# Patient Record
Sex: MALE | Race: WHITE | Employment: FULL TIME | URBAN - METROPOLITAN AREA
[De-identification: names, ages, dates, MRNs, and addresses within clinical notes are randomized per-mention and may not be internally consistent; named-entity substitution may affect disease eponyms.]

---

## 2023-03-13 DIAGNOSIS — E78.5 HYPERLIPIDEMIA, UNSPECIFIED HYPERLIPIDEMIA TYPE: ICD-10-CM

## 2023-03-13 RX ORDER — ROSUVASTATIN CALCIUM 40 MG/1
40 TABLET, COATED ORAL DAILY
COMMUNITY
Start: 2017-08-07 | End: 2023-03-13 | Stop reason: SDUPTHER

## 2023-03-13 RX ORDER — ROSUVASTATIN CALCIUM 40 MG/1
40 TABLET, COATED ORAL DAILY
Qty: 90 TABLET | Refills: 0 | Status: SHIPPED | OUTPATIENT
Start: 2023-03-13 | End: 2023-07-13

## 2023-06-02 ENCOUNTER — OFFICE VISIT (OUTPATIENT)
Dept: PRIMARY CARE | Facility: CLINIC | Age: 58
End: 2023-06-02
Payer: COMMERCIAL

## 2023-06-02 VITALS
BODY MASS INDEX: 31.21 KG/M2 | DIASTOLIC BLOOD PRESSURE: 90 MMHG | HEART RATE: 96 BPM | SYSTOLIC BLOOD PRESSURE: 138 MMHG | HEIGHT: 75 IN | WEIGHT: 251 LBS

## 2023-06-02 DIAGNOSIS — R10.31 ABDOMINAL PAIN, ACUTE, RIGHT LOWER QUADRANT: Primary | ICD-10-CM

## 2023-06-02 PROBLEM — I71.20 THORACIC AORTIC ANEURYSM (TAA) (CMS-HCC): Status: ACTIVE | Noted: 2023-06-02

## 2023-06-02 PROBLEM — M50.10 HERNIATION OF CERVICAL INTERVERTEBRAL DISC WITH RADICULOPATHY: Status: ACTIVE | Noted: 2023-06-02

## 2023-06-02 PROBLEM — K35.80 ACUTE APPENDICITIS: Status: RESOLVED | Noted: 2023-06-02 | Resolved: 2023-06-02

## 2023-06-02 PROBLEM — I25.10 CORONARY ARTERY CALCIFICATION SEEN ON CT SCAN: Status: ACTIVE | Noted: 2023-06-02

## 2023-06-02 PROBLEM — K35.80 ACUTE APPENDICITIS: Status: ACTIVE | Noted: 2023-06-02

## 2023-06-02 PROBLEM — D12.6 TUBULAR ADENOMA OF COLON: Status: ACTIVE | Noted: 2023-06-02

## 2023-06-02 PROBLEM — H40.1131 PRIMARY OPEN ANGLE GLAUCOMA OF BOTH EYES, MILD STAGE: Status: ACTIVE | Noted: 2021-02-16

## 2023-06-02 PROBLEM — E78.5 HYPERLIPEMIA: Status: ACTIVE | Noted: 2023-06-02

## 2023-06-02 PROCEDURE — 4004F PT TOBACCO SCREEN RCVD TLK: CPT | Performed by: FAMILY MEDICINE

## 2023-06-02 PROCEDURE — 99215 OFFICE O/P EST HI 40 MIN: CPT | Performed by: FAMILY MEDICINE

## 2023-06-02 RX ORDER — LATANOPROST 50 UG/ML
1 SOLUTION/ DROPS OPHTHALMIC DAILY
COMMUNITY
Start: 2022-12-20

## 2023-06-02 NOTE — PROGRESS NOTES
"Subjective   Patient ID: Tanner Ingram is a 57 y.o. male who presents for Abdominal Pain.    HPI  Crescencio is presenting with RLQ pain that began 2 days ago.  Never had this pain before, denies prior appendix removal.  Does not follow up with a gastroenterologist.  Rates the pain 9/10, had chills last night and minor blood in stool yesterday.    Denies changes in appetite, N/V, diarrhea, constipation, fever, radiating pain, numbness/tingling.  Has not taken anything to reduce his pain.    Prior colonoscopies in 2016 and 2019 are unremarkable for pathologies/polyps/diverticula in the ascending colon.    Review of Systems  All pertinent positive symptoms are included in the history of present illness.    All other systems have been reviewed and are negative and noncontributory to this patient's current ailments.    Current Outpatient Medications   Medication Instructions    latanoprost (Xalatan) 0.005 % ophthalmic solution 1 drop, Both Eyes, Daily    rosuvastatin (CRESTOR) 40 mg, oral, Daily     Not on File    Immunization History   Administered Date(s) Administered    Influenza, Unspecified 10/20/2020    Moderna SARS-CoV-2 Vaccination 03/26/2021, 04/23/2021    Tdap 08/17/2021     Past Surgical History:   Procedure Laterality Date    COLONOSCOPY  08/07/2017    Complete Colonoscopy     No family history on file.  Social History     Tobacco Use    Smoking status: Some Days     Types: Cigarettes    Smokeless tobacco: Never       Objective   Visit Vitals  /90   Pulse 96   Ht 1.905 m (6' 3\")   Wt 114 kg (251 lb)   BMI 31.37 kg/m²   Smoking Status Some Days   BSA 2.46 m²       Physical Exam  CONSTITUTIONAL - well nourished, well developed, looks like stated age, in no acute distress, not ill-appearing, and not tired appearing  SKIN - tan skin, normal skin turgor without rash, lesions, or nodules visualized  HEAD - no trauma, normocephalic  EYES - normal external exam  NECK - supple without rigidity, no neck mass was " observed, no thyromegaly or thyroid nodules  CHEST - clear to auscultation, no wheezing, no crackles and no rales, good effort  CARDIAC - regular rate and regular rhythm, no skipped beats, no murmur  ABDOMEN - significant tenderness to moderate palpation in the right mid quadrant area, although McBurney's is negative, jumping on right leg is negative, negative psoas, negative obturator, negative Rovsing, no rebound but there is some guarding  EXTREMITIES - no edema, no deformities  PSYCHIATRIC - alert, pleasant and cordial, age-appropriate  IMMUNOLOGIC - no cervical lymphadenopathy     Assessment/Plan   Problem List Items Addressed This Visit       Abdominal pain, acute, right lower quadrant - Primary     Based on your history and physical examination, I am concerned about an acute abdomen including the possibility of a retroverted appendicitis.  Although your McBurney sign and jumping on your right leg was negative, you have exquisite tenderness to palpation in the right quadrant.    We talked about other etiologies including diverticulitis, small bowel obstruction, liver dysfunction, or even a  problem, but those are very low on the list based on your colonoscopies from 2016 and 2019, both of which did not reveal any diverticulosis in the ascending colon, although you had multitudes of diverticuli in the descending colon and sigmoid colon.    At 2:46 PM, I spoke with Abraham at [facility] emergency department providing him report for your pending arrival.    Please drive to the emergency department to be evaluated further, hoping that you will leave here to go straight to the emergency department because of this is appendicitis, I suspect surgical intervention is going to be warranted this evening

## 2023-06-02 NOTE — ASSESSMENT & PLAN NOTE
Based on your history and physical examination, I am concerned about an acute abdomen including the possibility of a retroverted appendicitis.  Although your McBurney sign and jumping on your right leg was negative, you have exquisite tenderness to palpation in the right quadrant.    We talked about other etiologies including diverticulitis, small bowel obstruction, liver dysfunction, or even a  problem, but those are very low on the list based on your colonoscopies from 2016 and 2019, both of which did not reveal any diverticulosis in the ascending colon, although you had multitudes of diverticuli in the descending colon and sigmoid colon.    At 2:46 PM, I spoke with Abraham at [facility] emergency department providing him report for your pending arrival.    Please drive to the emergency department to be evaluated further, hoping that you will leave here to go straight to the emergency department because of this is appendicitis, I suspect surgical intervention is going to be warranted this evening

## 2023-06-02 NOTE — ASSESSMENT & PLAN NOTE
Based on signs/symptoms and previous medical history, I believe this may be acute appendicitis of a retroverted appendix. At 2:46 PM today I spoke with Abraham at [facility] and provided a report. After discussion, I told Tanner to drive to [facility]'s ED for further evaluation and let them know that I called and the ED is expecting him.

## 2023-07-09 DIAGNOSIS — E78.5 HYPERLIPIDEMIA, UNSPECIFIED HYPERLIPIDEMIA TYPE: ICD-10-CM

## 2023-07-13 RX ORDER — ROSUVASTATIN CALCIUM 40 MG/1
TABLET, COATED ORAL
Qty: 30 TABLET | Refills: 0 | Status: SHIPPED | OUTPATIENT
Start: 2023-07-13 | End: 2023-08-03 | Stop reason: SDUPTHER

## 2023-07-19 DIAGNOSIS — E78.2 MIXED HYPERLIPIDEMIA: Primary | ICD-10-CM

## 2023-07-20 ENCOUNTER — LAB (OUTPATIENT)
Dept: LAB | Facility: LAB | Age: 58
End: 2023-07-20
Payer: COMMERCIAL

## 2023-07-20 DIAGNOSIS — E78.2 MIXED HYPERLIPIDEMIA: ICD-10-CM

## 2023-07-20 LAB
ALANINE AMINOTRANSFERASE (SGPT) (U/L) IN SER/PLAS: 25 U/L (ref 10–52)
ALBUMIN (G/DL) IN SER/PLAS: 4.3 G/DL (ref 3.4–5)
ALKALINE PHOSPHATASE (U/L) IN SER/PLAS: 71 U/L (ref 33–120)
ANION GAP IN SER/PLAS: 9 MMOL/L (ref 10–20)
ASPARTATE AMINOTRANSFERASE (SGOT) (U/L) IN SER/PLAS: 22 U/L (ref 9–39)
BILIRUBIN TOTAL (MG/DL) IN SER/PLAS: 0.3 MG/DL (ref 0–1.2)
CALCIUM (MG/DL) IN SER/PLAS: 8.7 MG/DL (ref 8.6–10.3)
CARBON DIOXIDE, TOTAL (MMOL/L) IN SER/PLAS: 29 MMOL/L (ref 21–32)
CHLORIDE (MMOL/L) IN SER/PLAS: 105 MMOL/L (ref 98–107)
CHOLESTEROL (MG/DL) IN SER/PLAS: 182 MG/DL (ref 0–199)
CHOLESTEROL IN HDL (MG/DL) IN SER/PLAS: 33.8 MG/DL
CHOLESTEROL/HDL RATIO: 5.4
CREATININE (MG/DL) IN SER/PLAS: 0.72 MG/DL (ref 0.5–1.3)
GFR MALE: >90 ML/MIN/1.73M2
GLUCOSE (MG/DL) IN SER/PLAS: 109 MG/DL (ref 74–99)
LDL: ABNORMAL MG/DL (ref 0–99)
POTASSIUM (MMOL/L) IN SER/PLAS: 4.6 MMOL/L (ref 3.5–5.3)
PROTEIN TOTAL: 6.7 G/DL (ref 6.4–8.2)
SODIUM (MMOL/L) IN SER/PLAS: 138 MMOL/L (ref 136–145)
TRIGLYCERIDE (MG/DL) IN SER/PLAS: 593 MG/DL (ref 0–149)
UREA NITROGEN (MG/DL) IN SER/PLAS: 20 MG/DL (ref 6–23)
VLDL: ABNORMAL MG/DL (ref 0–40)

## 2023-07-20 PROCEDURE — 80061 LIPID PANEL: CPT

## 2023-07-20 PROCEDURE — 80053 COMPREHEN METABOLIC PANEL: CPT

## 2023-07-20 PROCEDURE — 36415 COLL VENOUS BLD VENIPUNCTURE: CPT

## 2023-07-20 NOTE — RESULT ENCOUNTER NOTE
We can review in the office on August 3, but suffice to say that cholesterol 182, 33, LDL could not be calculated because triglycerides 593  Previous 153, 40, 65, 237  Please know that we sent over a 30-day prescription for you on July 13 so please restart that medication

## 2023-08-03 ENCOUNTER — OFFICE VISIT (OUTPATIENT)
Dept: PRIMARY CARE | Facility: CLINIC | Age: 58
End: 2023-08-03
Payer: COMMERCIAL

## 2023-08-03 VITALS
HEART RATE: 70 BPM | SYSTOLIC BLOOD PRESSURE: 130 MMHG | WEIGHT: 253 LBS | DIASTOLIC BLOOD PRESSURE: 80 MMHG | BODY MASS INDEX: 31.62 KG/M2

## 2023-08-03 DIAGNOSIS — I25.10 CORONARY ARTERY CALCIFICATION SEEN ON CT SCAN: ICD-10-CM

## 2023-08-03 DIAGNOSIS — E78.2 MIXED HYPERLIPIDEMIA: Primary | ICD-10-CM

## 2023-08-03 DIAGNOSIS — R03.0 ELEVATED BLOOD-PRESSURE READING WITHOUT DIAGNOSIS OF HYPERTENSION: ICD-10-CM

## 2023-08-03 DIAGNOSIS — I71.21 ANEURYSM OF ASCENDING AORTA WITHOUT RUPTURE (CMS-HCC): ICD-10-CM

## 2023-08-03 PROBLEM — K35.80 APPENDICITIS, ACUTE: Status: ACTIVE | Noted: 2023-08-03

## 2023-08-03 PROBLEM — R10.31 ABDOMINAL PAIN, ACUTE, RIGHT LOWER QUADRANT: Status: RESOLVED | Noted: 2023-06-02 | Resolved: 2023-08-03

## 2023-08-03 PROBLEM — K35.80 APPENDICITIS, ACUTE: Status: RESOLVED | Noted: 2023-08-03 | Resolved: 2023-08-03

## 2023-08-03 PROCEDURE — 99214 OFFICE O/P EST MOD 30 MIN: CPT | Performed by: FAMILY MEDICINE

## 2023-08-03 PROCEDURE — 4004F PT TOBACCO SCREEN RCVD TLK: CPT | Performed by: FAMILY MEDICINE

## 2023-08-03 RX ORDER — ROSUVASTATIN CALCIUM 40 MG/1
40 TABLET, COATED ORAL DAILY
Qty: 90 TABLET | Refills: 1 | Status: SHIPPED | OUTPATIENT
Start: 2023-08-03 | End: 2024-02-19 | Stop reason: SDUPTHER

## 2023-08-03 ASSESSMENT — PATIENT HEALTH QUESTIONNAIRE - PHQ9
SUM OF ALL RESPONSES TO PHQ9 QUESTIONS 1 AND 2: 0
1. LITTLE INTEREST OR PLEASURE IN DOING THINGS: NOT AT ALL
2. FEELING DOWN, DEPRESSED OR HOPELESS: NOT AT ALL

## 2023-08-03 NOTE — PROGRESS NOTES
Subjective   Patient ID: Tanner Ingram is a 58 y.o. male who presents for Hyperlipidemia.    HPI  1.  Hyperlipidemia, Coronary artery calcification seen on CT scan, Thoracic aortic aneurysm (TAA)  ASCVD Risk of 19.4% today with CT Cardiac score of 396.2 on 2/23/2023.   Additional finding of a 4.2 cm dilation of the ascending thoracic aortic aneurysm (6-12 month follow-up CT/MR angiogram recommended)   Last lipid panel (7/20/2023) with cholesterol 182, LDL 33.8, HDL 5.4, and .  Taking atorvastatin 40 mg once daily.   Denies medication side effects including myalgias.     2.  History of appendectomy  Since last visit, he underwent an appendectomy, leaving the office and having the appendix out the following day  Says he was golfing 5 days later, without any significant residual symptoms whatsoever  Feels fantastic, very thankful for the office for taking care of him so quickly    Review of Systems  All pertinent positive symptoms are included in the history of present illness.    All other systems have been reviewed and are negative and noncontributory to this patient's current ailments.    No Known Allergies    Immunization History   Administered Date(s) Administered    Influenza, Unspecified 10/20/2020    Moderna SARS-CoV-2 Vaccination 03/26/2021, 04/23/2021    Tdap vaccine, age 10 years and older (BOOSTRIX) 08/17/2021       Objective   Visit Vitals  /80   Pulse 70   Wt 115 kg (253 lb)   BMI 31.62 kg/m²   Smoking Status Some Days   BSA 2.47 m²       Physical Exam  CONSTITUTIONAL - well nourished, well developed, looks like stated age, in no acute distress, not ill-appearing, and not tired appearing  SKIN - normal skin color and pigmentation, normal skin turgor without rash, lesions, or nodules visualized  HEAD - no trauma, normocephalic  EYES - pupils are equal and reactive to light, extraocular muscles are intact, and normal external exam  CHEST - clear to auscultation, no wheezing, no crackles and no  rales, good effort  CARDIAC - regular rate and regular rhythm, no skipped beats, no murmur  ABDOMEN - no organomegaly, soft, nontender, nondistended, normal bowel sounds, no guarding/rebound/rigidity, negative McBurney sign and negative De La Vega sign  EXTREMITIES - no edema, no deformities  NEUROLOGICAL - normal gait, normal balance, normal motor, no ataxia, alert, oriented and no focal signs  PSYCHIATRIC - alert, pleasant and cordial, age-appropriate    Assessment/Plan   Problem List Items Addressed This Visit       Hyperlipemia - Primary     Triglycerides significantly elevated, need to do a better job with dietary changes including decreasing carbohydrates  Please schedule physical exam 6 months, continue to work on lifestyle modifications         Relevant Medications    rosuvastatin (Crestor) 40 mg tablet    Thoracic aortic aneurysm (TAA) (CMS/[facility])     We will continue to monitor this through imaging  Schedule physical exam 6 months to ensure that we have proper follow-up in place         Coronary artery calcification seen on CT scan     Need to continue to reduce risk factors to lower cholesterol is much as possible especially with your CT cardiac score elevation         Elevated blood-pressure reading without diagnosis of hypertension     I would like to have you monitor and record blood pressures at home   Blood pressure goal should be below 130/80, ideally 120/80  If the blood pressure is too high, we need to consider starting antihypertensive therapy

## 2023-08-03 NOTE — ASSESSMENT & PLAN NOTE
Triglycerides significantly elevated, need to do a better job with dietary changes including decreasing carbohydrates  Please schedule physical exam 6 months, continue to work on lifestyle modifications

## 2023-08-03 NOTE — ASSESSMENT & PLAN NOTE
I would like to have you monitor and record blood pressures at home   Blood pressure goal should be below 130/80, ideally 120/80  If the blood pressure is too high, we need to consider starting antihypertensive therapy

## 2023-08-03 NOTE — ASSESSMENT & PLAN NOTE
Need to continue to reduce risk factors to lower cholesterol is much as possible especially with your CT cardiac score elevation

## 2023-08-03 NOTE — ASSESSMENT & PLAN NOTE
We will continue to monitor this through imaging  Schedule physical exam 6 months to ensure that we have proper follow-up in place

## 2023-08-20 LAB
GRAM STAIN: NORMAL
TISSUE/WOUND CULTURE/SMEAR: NORMAL

## 2023-11-29 ENCOUNTER — TELEPHONE (OUTPATIENT)
Dept: CARDIOLOGY | Facility: CLINIC | Age: 58
End: 2023-11-29
Payer: COMMERCIAL

## 2023-11-29 NOTE — TELEPHONE ENCOUNTER
Called patient and left message to call and schedule one year appointment with echo.  This will be with Dr. Irvin schedule after 5/2/2024

## 2023-11-30 ENCOUNTER — TELEPHONE (OUTPATIENT)
Dept: CARDIOLOGY | Facility: CLINIC | Age: 58
End: 2023-11-30
Payer: COMMERCIAL

## 2023-11-30 NOTE — TELEPHONE ENCOUNTER
Patient had to pay over 4,000.00 for stress test last year.   Before he does the echo this year he wanted to make sure it is needed?   Please advise.

## 2023-12-07 ENCOUNTER — OFFICE VISIT (OUTPATIENT)
Dept: DERMATOLOGY | Facility: CLINIC | Age: 58
End: 2023-12-07
Payer: COMMERCIAL

## 2023-12-07 DIAGNOSIS — L72.0 EPIDERMAL CYST: ICD-10-CM

## 2023-12-07 DIAGNOSIS — M67.449 DIGITAL MUCOUS CYST OF FINGER: Primary | ICD-10-CM
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
## 2023-12-07 PROCEDURE — 88305 TISSUE EXAM BY PATHOLOGIST: CPT | Mod: TC,DER | Performed by: SPECIALIST

## 2023-12-07 PROCEDURE — 88305 TISSUE EXAM BY PATHOLOGIST: CPT | Performed by: DERMATOLOGY

## 2023-12-07 PROCEDURE — 26010 DRAINAGE OF FINGER ABSCESS: CPT | Performed by: SPECIALIST

## 2023-12-07 PROCEDURE — 99214 OFFICE O/P EST MOD 30 MIN: CPT | Performed by: SPECIALIST

## 2023-12-07 PROCEDURE — 4004F PT TOBACCO SCREEN RCVD TLK: CPT | Performed by: SPECIALIST

## 2023-12-07 PROCEDURE — 11104 PUNCH BX SKIN SINGLE LESION: CPT | Performed by: SPECIALIST

## 2023-12-07 NOTE — PROGRESS NOTES
Subjective   HPI: Tanner Ingram is a 58 y.o. male is here for evaluation and treatment of a developing cyst on the back of my neck.  An insect bite that will not improve involving by left lower leg.  And a painful bump involving the tip of my finger.    ROS: No other skin or systemic complaints other than what is documented elsewhere in the note.    ALLERGIES: Patient has no known allergies.    SOCIAL:  reports that he has been smoking cigarettes. He has never used smokeless tobacco. No history on file for alcohol use and drug use.    Objective     Description: Patient has a slightly inflamed subcutaneous cystic lesion posterior neck.  Clinically is consistent with a slightly inflamed epidermal cyst.  Patient also has a cystic slightly inflamed lesion involving the third finger of his left hand.  This overlies the nailbed leading to subsequent Deepak of the nail plate.  Clinically is consistent with an inflamed digital myxoid cyst.  Patient also has a previously treated area of an alleged arthropod assault involving left lower anterior tibial surface.  This area has not improved with topical steroids.    Assessment/Plan   1. Digital mucous cyst of finger  Left Dorsal Mid 3rd Finger    Related Medications  triamcinolone acetonide (Kenalog) injection 10 mg      2. Epidermal cyst  Posterior Mid Neck    3. Bitten or stung by nonvenomous insect and other nonvenomous arthropods, initial encounter  Left Lower Leg - Anterior    Lesion biopsy - Left Lower Leg - Anterior    Specimen 1 - Dermatopathology- DERM LAB  Differential Diagnosis: Arthropod Assult   Check Margins Yes/No?:    Comments:    Dermpath Lab: Routine Histopathology (formalin-fixed tissue)         FOLLOW UP:  weeks for sterile surgical excision of his partially inflamed epidermal cyst.  Treatment of his digital myxoid cyst can be checked at that time.  Suture removal of a biopsy for diagnosis only of the inflamed arthropod assault.    The patient was  encouraged to contact me with any further questions or concerns.  Abel Zamudio MD  12/7/2023

## 2023-12-07 NOTE — PROGRESS NOTES
Subjective     Tanner Ingram is a 58 y.o. male who presents for the following: Infection (Left Hand index finger ) and Growth Concerns (Mid Posterior Neck ).     Review of Systems:  No other skin or systemic complaints other than what is documented elsewhere in the note.    The following portions of the chart were reviewed this encounter and updated as appropriate:          Skin Cancer History  No skin cancer on file.      Specialty Problems    None       Objective   Well appearing patient in no apparent distress; mood and affect are within normal limits.    A focused skin examination was performed. All findings within normal limits unless otherwise noted below.    Assessment/Plan   1. Epidermal cyst  Posterior Mid Neck    2. Bitten or stung by nonvenomous insect and other nonvenomous arthropods, initial encounter  Left Lower Leg - Anterior    Lesion biopsy - Left Lower Leg - Anterior  Type of biopsy: punch    Informed consent: discussed and consent obtained    Timeout: patient name, date of birth, surgical site, and procedure verified    Procedure prep:  Patient was prepped and draped  Anesthesia: the lesion was anesthetized in a standard fashion    Anesthetic:  1% lidocaine w/ epinephrine 1-100,000 local infiltration  Punch size:  4 mm  Suture size:  4-0  Suture type: Prolene (polypropylene)    Suture removal (days):  14  Hemostasis achieved with: suture    Outcome: patient tolerated procedure well    Post-procedure details: sterile dressing applied and wound care instructions given    Dressing type: bandage and petrolatum      Specimen 1 - Dermatopathology- DERM LAB  Differential Diagnosis: Arthropod Assult   Check Margins Yes/No?:    Comments:    Dermpath Lab: Routine Histopathology (formalin-fixed tissue)    3. Digital mucous cyst of finger  Left Dorsal Mid 3rd Finger

## 2023-12-11 LAB
LABORATORY COMMENT REPORT: NORMAL
PATH REPORT.FINAL DX SPEC: NORMAL
PATH REPORT.GROSS SPEC: NORMAL
PATH REPORT.RELEVANT HX SPEC: NORMAL
PATH REPORT.TOTAL CANCER: NORMAL

## 2024-02-01 DIAGNOSIS — Z12.11 COLON CANCER SCREENING: Primary | ICD-10-CM

## 2024-02-01 RX ORDER — SOD SULF/POT CHLORIDE/MAG SULF 1.479 G
TABLET ORAL
Qty: 24 TABLET | Refills: 0 | Status: SHIPPED | OUTPATIENT
Start: 2024-02-01

## 2024-02-05 ENCOUNTER — APPOINTMENT (OUTPATIENT)
Dept: PRIMARY CARE | Facility: CLINIC | Age: 59
End: 2024-02-05
Payer: COMMERCIAL

## 2024-02-06 ENCOUNTER — APPOINTMENT (OUTPATIENT)
Dept: DERMATOLOGY | Facility: CLINIC | Age: 59
End: 2024-02-06
Payer: COMMERCIAL

## 2024-02-13 DIAGNOSIS — E78.2 MIXED HYPERLIPIDEMIA: ICD-10-CM

## 2024-02-13 RX ORDER — ROSUVASTATIN CALCIUM 40 MG/1
40 TABLET, COATED ORAL DAILY
Qty: 90 TABLET | Refills: 1 | OUTPATIENT
Start: 2024-02-13

## 2024-02-19 RX ORDER — ROSUVASTATIN CALCIUM 40 MG/1
40 TABLET, COATED ORAL DAILY
Qty: 30 TABLET | Refills: 0 | Status: SHIPPED | OUTPATIENT
Start: 2024-02-19 | End: 2024-02-27 | Stop reason: SDUPTHER

## 2024-02-22 ENCOUNTER — LAB (OUTPATIENT)
Dept: LAB | Facility: LAB | Age: 59
End: 2024-02-22
Payer: COMMERCIAL

## 2024-02-22 DIAGNOSIS — Z12.5 PROSTATE CANCER SCREENING: ICD-10-CM

## 2024-02-22 DIAGNOSIS — Z11.4 ENCOUNTER FOR SCREENING FOR HIV: ICD-10-CM

## 2024-02-22 DIAGNOSIS — Z00.00 ANNUAL PHYSICAL EXAM: ICD-10-CM

## 2024-02-22 DIAGNOSIS — Z11.59 NEED FOR HEPATITIS C SCREENING TEST: ICD-10-CM

## 2024-02-22 DIAGNOSIS — Z00.00 ANNUAL PHYSICAL EXAM: Primary | ICD-10-CM

## 2024-02-22 LAB
ALBUMIN SERPL BCP-MCNC: 4.1 G/DL (ref 3.4–5)
ALP SERPL-CCNC: 66 U/L (ref 33–136)
ALT SERPL W P-5'-P-CCNC: 26 U/L (ref 10–52)
ANION GAP SERPL CALC-SCNC: 11 MMOL/L (ref 10–20)
AST SERPL W P-5'-P-CCNC: 21 U/L (ref 9–39)
BASOPHILS # BLD AUTO: 0.02 X10*3/UL (ref 0–0.1)
BASOPHILS NFR BLD AUTO: 0.3 %
BILIRUB SERPL-MCNC: 0.4 MG/DL (ref 0–1.2)
BUN SERPL-MCNC: 17 MG/DL (ref 6–23)
CALCIUM SERPL-MCNC: 9.1 MG/DL (ref 8.6–10.3)
CHLORIDE SERPL-SCNC: 103 MMOL/L (ref 98–107)
CHOLEST SERPL-MCNC: 165 MG/DL (ref 0–199)
CHOLESTEROL/HDL RATIO: 4.2
CO2 SERPL-SCNC: 26 MMOL/L (ref 21–32)
CREAT SERPL-MCNC: 0.74 MG/DL (ref 0.5–1.3)
EGFRCR SERPLBLD CKD-EPI 2021: >90 ML/MIN/1.73M*2
EOSINOPHIL # BLD AUTO: 0.05 X10*3/UL (ref 0–0.7)
EOSINOPHIL NFR BLD AUTO: 0.8 %
ERYTHROCYTE [DISTWIDTH] IN BLOOD BY AUTOMATED COUNT: 13.7 % (ref 11.5–14.5)
GLUCOSE SERPL-MCNC: 99 MG/DL (ref 74–99)
HCT VFR BLD AUTO: 49.6 % (ref 41–52)
HCV AB SER QL: NONREACTIVE
HDLC SERPL-MCNC: 39 MG/DL
HGB BLD-MCNC: 16.6 G/DL (ref 13.5–17.5)
HIV 1+2 AB+HIV1 P24 AG SERPL QL IA: NONREACTIVE
IMM GRANULOCYTES # BLD AUTO: 0.02 X10*3/UL (ref 0–0.7)
IMM GRANULOCYTES NFR BLD AUTO: 0.3 % (ref 0–0.9)
LDLC SERPL CALC-MCNC: 47 MG/DL
LYMPHOCYTES # BLD AUTO: 1.84 X10*3/UL (ref 1.2–4.8)
LYMPHOCYTES NFR BLD AUTO: 30 %
MCH RBC QN AUTO: 29.7 PG (ref 26–34)
MCHC RBC AUTO-ENTMCNC: 33.5 G/DL (ref 32–36)
MCV RBC AUTO: 89 FL (ref 80–100)
MONOCYTES # BLD AUTO: 0.48 X10*3/UL (ref 0.1–1)
MONOCYTES NFR BLD AUTO: 7.8 %
NEUTROPHILS # BLD AUTO: 3.72 X10*3/UL (ref 1.2–7.7)
NEUTROPHILS NFR BLD AUTO: 60.8 %
NON HDL CHOLESTEROL: 126 MG/DL (ref 0–149)
NRBC BLD-RTO: 0 /100 WBCS (ref 0–0)
PLATELET # BLD AUTO: 215 X10*3/UL (ref 150–450)
POTASSIUM SERPL-SCNC: 4.1 MMOL/L (ref 3.5–5.3)
PROT SERPL-MCNC: 7.2 G/DL (ref 6.4–8.2)
PSA SERPL-MCNC: 0.99 NG/ML
RBC # BLD AUTO: 5.59 X10*6/UL (ref 4.5–5.9)
SODIUM SERPL-SCNC: 136 MMOL/L (ref 136–145)
T4 FREE SERPL-MCNC: 0.93 NG/DL (ref 0.61–1.12)
TRIGL SERPL-MCNC: 395 MG/DL (ref 0–149)
TSH SERPL-ACNC: 4.2 MIU/L (ref 0.44–3.98)
VLDL: 79 MG/DL (ref 0–40)
WBC # BLD AUTO: 6.1 X10*3/UL (ref 4.4–11.3)

## 2024-02-22 PROCEDURE — 86803 HEPATITIS C AB TEST: CPT

## 2024-02-22 PROCEDURE — 84443 ASSAY THYROID STIM HORMONE: CPT

## 2024-02-22 PROCEDURE — 85025 COMPLETE CBC W/AUTO DIFF WBC: CPT

## 2024-02-22 PROCEDURE — 84439 ASSAY OF FREE THYROXINE: CPT

## 2024-02-22 PROCEDURE — 87389 HIV-1 AG W/HIV-1&-2 AB AG IA: CPT

## 2024-02-22 PROCEDURE — 80053 COMPREHEN METABOLIC PANEL: CPT

## 2024-02-22 PROCEDURE — 84153 ASSAY OF PSA TOTAL: CPT

## 2024-02-22 PROCEDURE — 80061 LIPID PANEL: CPT

## 2024-02-22 PROCEDURE — 36415 COLL VENOUS BLD VENIPUNCTURE: CPT

## 2024-02-22 NOTE — RESULT ENCOUNTER NOTE
We will review test results on February 27:  Cholesterol 165, 39, 47, 395 previously 182, 33, LDL could not be calculated secondary to triglycerides of 593  TSH 4.20 previously 5.65  T4 is normal, so I do not think we need to treat unless you are symptomatic  HIV and hepatitis C negative  Sugar, kidney, liver, electrolytes normal  Prostate cancer screening test normal  Complete blood cell count normal

## 2024-02-27 ENCOUNTER — OFFICE VISIT (OUTPATIENT)
Dept: PRIMARY CARE | Facility: CLINIC | Age: 59
End: 2024-02-27
Payer: COMMERCIAL

## 2024-02-27 VITALS
HEART RATE: 75 BPM | WEIGHT: 260 LBS | SYSTOLIC BLOOD PRESSURE: 144 MMHG | HEIGHT: 75 IN | DIASTOLIC BLOOD PRESSURE: 90 MMHG | BODY MASS INDEX: 32.33 KG/M2

## 2024-02-27 DIAGNOSIS — I71.21 ANEURYSM OF ASCENDING AORTA WITHOUT RUPTURE (CMS-HCC): ICD-10-CM

## 2024-02-27 DIAGNOSIS — Z00.00 ANNUAL PHYSICAL EXAM: Primary | ICD-10-CM

## 2024-02-27 DIAGNOSIS — E78.2 MIXED HYPERLIPIDEMIA: ICD-10-CM

## 2024-02-27 DIAGNOSIS — I10 PRIMARY HYPERTENSION: ICD-10-CM

## 2024-02-27 PROBLEM — M50.10 HERNIATION OF CERVICAL INTERVERTEBRAL DISC WITH RADICULOPATHY: Status: RESOLVED | Noted: 2023-06-02 | Resolved: 2024-02-27

## 2024-02-27 LAB
POC APPEARANCE, URINE: CLEAR
POC BILIRUBIN, URINE: NEGATIVE
POC BLOOD, URINE: NEGATIVE
POC COLOR, URINE: YELLOW
POC GLUCOSE, URINE: NEGATIVE MG/DL
POC KETONES, URINE: NEGATIVE MG/DL
POC LEUKOCYTES, URINE: NEGATIVE
POC NITRITE,URINE: NEGATIVE
POC PH, URINE: 6 PH
POC PROTEIN, URINE: NEGATIVE MG/DL
POC SPECIFIC GRAVITY, URINE: 1.02
POC UROBILINOGEN, URINE: 0.2 EU/DL

## 2024-02-27 PROCEDURE — 3077F SYST BP >= 140 MM HG: CPT | Performed by: FAMILY MEDICINE

## 2024-02-27 PROCEDURE — 93000 ELECTROCARDIOGRAM COMPLETE: CPT | Performed by: FAMILY MEDICINE

## 2024-02-27 PROCEDURE — 99396 PREV VISIT EST AGE 40-64: CPT | Performed by: FAMILY MEDICINE

## 2024-02-27 PROCEDURE — 81002 URINALYSIS NONAUTO W/O SCOPE: CPT | Performed by: FAMILY MEDICINE

## 2024-02-27 PROCEDURE — 99214 OFFICE O/P EST MOD 30 MIN: CPT | Performed by: FAMILY MEDICINE

## 2024-02-27 PROCEDURE — 4004F PT TOBACCO SCREEN RCVD TLK: CPT | Performed by: FAMILY MEDICINE

## 2024-02-27 PROCEDURE — 3079F DIAST BP 80-89 MM HG: CPT | Performed by: FAMILY MEDICINE

## 2024-02-27 RX ORDER — OLMESARTAN MEDOXOMIL 20 MG/1
20 TABLET ORAL DAILY
Qty: 90 TABLET | Refills: 1 | Status: SHIPPED | OUTPATIENT
Start: 2024-02-27 | End: 2024-08-25

## 2024-02-27 RX ORDER — ROSUVASTATIN CALCIUM 40 MG/1
40 TABLET, COATED ORAL DAILY
Qty: 30 TABLET | Refills: 0 | Status: SHIPPED
Start: 2024-02-27 | End: 2024-03-25 | Stop reason: SDUPTHER

## 2024-02-27 ASSESSMENT — PATIENT HEALTH QUESTIONNAIRE - PHQ9
2. FEELING DOWN, DEPRESSED OR HOPELESS: NOT AT ALL
SUM OF ALL RESPONSES TO PHQ9 QUESTIONS 1 AND 2: 0
1. LITTLE INTEREST OR PLEASURE IN DOING THINGS: NOT AT ALL

## 2024-02-27 NOTE — ASSESSMENT & PLAN NOTE
Stable. No changes to medical management   Continue to follow with cardiology, echo scheduled in a few months

## 2024-02-27 NOTE — ASSESSMENT & PLAN NOTE
Declined shingles vaccine  Colonoscopy scheduled for April  Physical exam without abnormalities  EKG with sinus rhythm, occasional ectopic beat

## 2024-02-27 NOTE — PROGRESS NOTES
"Subjective   Patient ID: Tanner Ingram \"Radha" is a 58 y.o. male who presents for Annual Exam and Hyperlipidemia.    Past Medical, Surgical, and Family History reviewed and updated in chart.    Reviewed all medications by prescribing practitioner or clinical pharmacist (such as prescriptions, OTCs, herbal therapies and supplements) and documented in the medical record.    HPI  1.  Physical exam.  Colonoscopy: scheduled for April  Immunizations: Tdap 2021, declines shingles vaccine  Social: smokes 1/2 pack per day, for 20 years (10 pack years)    2.  HLD.  Controlled on Rosuvastatin  Denies any medication side effects including myalgias  Denies ACS symptoms    3.  Ascending thoracic aortic aneurysm.  Follows with cardiology  Mild dilation with diameter of 4.2 on CT cardiac score  Has upcoming TTE scheduled for 5/7/2024    4.  HTN.  Has had multiple elevated blood pressure readings in office  Not currently on any medication for elevated blood pressure  Denies any headaches, blurred vision or ACS symptoms    Review of Systems  All pertinent positive symptoms are included in the history of present illness.    All other systems have been reviewed and are negative and noncontributory to this patient's current ailments.    Past Medical History:   Diagnosis Date    Hyperlipidemia, unspecified 06/17/2022    Hyperlipemia     Past Surgical History:   Procedure Laterality Date    COLONOSCOPY  08/07/2017    Complete Colonoscopy     Social History     Tobacco Use    Smoking status: Some Days     Types: Cigarettes    Smokeless tobacco: Never     No family history on file.  Immunization History   Administered Date(s) Administered    Flu vaccine (IIV4), preservative free *Check age/dose* 11/14/2023    Influenza, Unspecified 10/20/2020    Moderna SARS-CoV-2 Vaccination 03/26/2021, 04/23/2021    Tdap vaccine, age 7 year and older (BOOSTRIX, ADACEL) 08/17/2021     Current Outpatient Medications   Medication Instructions    latanoprost " "(Xalatan) 0.005 % ophthalmic solution 1 drop, Both Eyes, Daily    olmesartan (BENICAR) 20 mg, oral, Daily    rosuvastatin (CRESTOR) 40 mg, oral, Daily    sod sulf-pot chloride-mag sulf (Sutab) 1.479-0.188- 0.225 gram tablet Starting at 6pm open one bottle of pills and fill glass provided with water and drink according to prep sheet. Start the 2nd bottle with directions on prep sheet 5 hours before procedure time     No Known Allergies    Objective   Vitals:    02/27/24 0832 02/27/24 0839   BP: 140/82 144/90   Pulse: 75    Weight: 118 kg (260 lb)    Height: 1.905 m (6' 3\")      Body mass index is 32.5 kg/m².    BP Readings from Last 3 Encounters:   02/27/24 144/90   08/03/23 130/80   06/16/23 147/86      Wt Readings from Last 3 Encounters:   02/27/24 118 kg (260 lb)   08/03/23 115 kg (253 lb)   06/16/23 116 kg (255 lb)      Office Visit on 02/27/2024   Component Date Value    POC Color, Urine 02/27/2024 Yellow     POC Appearance, Urine 02/27/2024 Clear     POC Glucose, Urine 02/27/2024 NEGATIVE     POC Bilirubin, Urine 02/27/2024 NEGATIVE     POC Ketones, Urine 02/27/2024 NEGATIVE     POC Specific Gravity, Ur* 02/27/2024 1.020     POC Blood, Urine 02/27/2024 NEGATIVE     POC PH, Urine 02/27/2024 6.0     POC Protein, Urine 02/27/2024 NEGATIVE     POC Urobilinogen, Urine 02/27/2024 0.2     Poc Nitrite, Urine 02/27/2024 NEGATIVE     POC Leukocytes, Urine 02/27/2024 NEGATIVE    Lab on 02/22/2024   Component Date Value    Cholesterol 02/22/2024 165     HDL-Cholesterol 02/22/2024 39.0     Cholesterol/HDL Ratio 02/22/2024 4.2     LDL Calculated 02/22/2024 47     VLDL 02/22/2024 79 (H)     Triglycerides 02/22/2024 395 (H)     Non HDL Cholesterol 02/22/2024 126     Prostate Specific AG 02/22/2024 0.99     Thyroid Stimulating Horm* 02/22/2024 4.20 (H)     Glucose 02/22/2024 99     Sodium 02/22/2024 136     Potassium 02/22/2024 4.1     Chloride 02/22/2024 103     Bicarbonate 02/22/2024 26     Anion Gap 02/22/2024 11     Urea " Nitrogen 02/22/2024 17     Creatinine 02/22/2024 0.74     eGFR 02/22/2024 >90     Calcium 02/22/2024 9.1     Albumin 02/22/2024 4.1     Alkaline Phosphatase 02/22/2024 66     Total Protein 02/22/2024 7.2     AST 02/22/2024 21     Bilirubin, Total 02/22/2024 0.4     ALT 02/22/2024 26     WBC 02/22/2024 6.1     nRBC 02/22/2024 0.0     RBC 02/22/2024 5.59     Hemoglobin 02/22/2024 16.6     Hematocrit 02/22/2024 49.6     MCV 02/22/2024 89     MCH 02/22/2024 29.7     MCHC 02/22/2024 33.5     RDW 02/22/2024 13.7     Platelets 02/22/2024 215     Neutrophils % 02/22/2024 60.8     Immature Granulocytes %,* 02/22/2024 0.3     Lymphocytes % 02/22/2024 30.0     Monocytes % 02/22/2024 7.8     Eosinophils % 02/22/2024 0.8     Basophils % 02/22/2024 0.3     Neutrophils Absolute 02/22/2024 3.72     Immature Granulocytes Ab* 02/22/2024 0.02     Lymphocytes Absolute 02/22/2024 1.84     Monocytes Absolute 02/22/2024 0.48     Eosinophils Absolute 02/22/2024 0.05     Basophils Absolute 02/22/2024 0.02     HIV 1/2 Antigen/Antibody* 02/22/2024 Nonreactive     Hepatitis C AB 02/22/2024 Nonreactive     Thyroxine, Free 02/22/2024 0.93      Physical Exam  CONSTITUTIONAL - well nourished, well developed, looks like stated age, in no acute distress, not ill-appearing, and not tired appearing  SKIN - normal skin color and pigmentation, normal skin turgor without rash, lesions, or nodules visualized  HEAD - no trauma, normocephalic  EYES - normal external exam  NECK - supple without rigidity, no neck mass was observed, no thyromegaly or thyroid nodules  CHEST - clear to auscultation, no wheezing, no crackles and no rales, good effort  CARDIAC - regular rate and regular rhythm, no skipped beats, no murmur  EXTREMITIES - no obvious or evident edema, no obvious or evident deformities  NEUROLOGICAL - normal gait, normal balance, normal motor, no ataxia, alert, oriented and no focal signs  PSYCHIATRIC - alert, pleasant and cordial,  age-appropriate  IMMUNOLOGIC - no cervical lymphadenopathy    Assessment/Plan   Problem List Items Addressed This Visit       Hyperlipemia     Triglycerides remain elevated but better controlled  Please keep working on dietary changes with a goal of weight loss         Relevant Medications    rosuvastatin (Crestor) 40 mg tablet    Thoracic aortic aneurysm (TAA) (CMS/HCC)     Stable. No changes to medical management   Continue to follow with cardiology, echo scheduled in a few months         Primary hypertension     BP continues to be elevated, and therefore by definition you have hypertension    Highly recommend starting olmesartan 20 mg daily to reduce not only your blood pressure, but it should reduce risk of premature aneurysm rupture    I would like to have you monitor and record blood pressures at home   Blood pressure goal should be below 130/80, ideally 120/80  If the blood pressure is too high or too low, we need to consider making adjustments to your antihypertensive therapy         Relevant Medications    olmesartan (BENIcar) 20 mg tablet    Annual physical exam - Primary     Declined shingles vaccine  Colonoscopy scheduled for April  Physical exam without abnormalities  EKG with sinus rhythm, occasional ectopic beat         Relevant Orders    POCT UA (nonautomated) manually resulted (Completed)    ECG 12 lead (Clinic Performed) (Completed)

## 2024-02-27 NOTE — ASSESSMENT & PLAN NOTE
BP continues to be elevated, and therefore by definition you have hypertension    Highly recommend starting olmesartan 20 mg daily to reduce not only your blood pressure, but it should reduce risk of premature aneurysm rupture    I would like to have you monitor and record blood pressures at home   Blood pressure goal should be below 130/80, ideally 120/80  If the blood pressure is too high or too low, we need to consider making adjustments to your antihypertensive therapy

## 2024-02-27 NOTE — ASSESSMENT & PLAN NOTE
Triglycerides remain elevated but better controlled  Please keep working on dietary changes with a goal of weight loss

## 2024-03-21 DIAGNOSIS — E78.2 MIXED HYPERLIPIDEMIA: ICD-10-CM

## 2024-03-21 RX ORDER — ROSUVASTATIN CALCIUM 40 MG/1
40 TABLET, COATED ORAL DAILY
Qty: 90 TABLET | Refills: 1 | OUTPATIENT
Start: 2024-03-21

## 2024-03-25 RX ORDER — ROSUVASTATIN CALCIUM 40 MG/1
40 TABLET, COATED ORAL DAILY
Qty: 90 TABLET | Refills: 1 | Status: SHIPPED | OUTPATIENT
Start: 2024-03-25 | End: 2024-09-21

## 2024-04-01 ENCOUNTER — APPOINTMENT (OUTPATIENT)
Dept: GASTROENTEROLOGY | Facility: EXTERNAL LOCATION | Age: 59
End: 2024-04-01
Payer: COMMERCIAL

## 2024-04-08 ENCOUNTER — OFFICE VISIT (OUTPATIENT)
Dept: GASTROENTEROLOGY | Facility: EXTERNAL LOCATION | Age: 59
End: 2024-04-08
Payer: COMMERCIAL

## 2024-04-08 DIAGNOSIS — D12.2 BENIGN NEOPLASM OF ASCENDING COLON: ICD-10-CM

## 2024-04-08 DIAGNOSIS — Z86.010 PERSONAL HISTORY OF COLONIC POLYPS: Primary | ICD-10-CM

## 2024-04-08 DIAGNOSIS — Z12.11 SCREEN FOR COLON CANCER: ICD-10-CM

## 2024-04-08 PROCEDURE — 88305 TISSUE EXAM BY PATHOLOGIST: CPT | Performed by: PATHOLOGY

## 2024-04-08 PROCEDURE — 45385 COLONOSCOPY W/LESION REMOVAL: CPT | Performed by: INTERNAL MEDICINE

## 2024-04-08 PROCEDURE — 88305 TISSUE EXAM BY PATHOLOGIST: CPT

## 2024-04-08 NOTE — PROGRESS NOTES
Patient underwent colonoscopy for history of polyps and 4 small polyps removed.  The exam was otherwise unremarkable and well-tolerated.  I recommend repeat 5 years.

## 2024-04-09 ENCOUNTER — LAB REQUISITION (OUTPATIENT)
Dept: LAB | Facility: HOSPITAL | Age: 59
End: 2024-04-09
Payer: COMMERCIAL

## 2024-05-07 ENCOUNTER — APPOINTMENT (OUTPATIENT)
Dept: CARDIOLOGY | Facility: CLINIC | Age: 59
End: 2024-05-07
Payer: COMMERCIAL

## 2024-05-13 ENCOUNTER — APPOINTMENT (OUTPATIENT)
Dept: CARDIOLOGY | Facility: CLINIC | Age: 59
End: 2024-05-13
Payer: COMMERCIAL

## 2024-09-05 NOTE — PROGRESS NOTES
"Subjective   Patient ID: Tanner Ingram \"Radha" is a 59 y.o. male who presents for Hypertension and Hyperlipidemia.    Past Medical, Surgical, and Family History reviewed and updated in chart.    Reviewed all medications by prescribing practitioner or clinical pharmacist (such as prescriptions, OTCs, herbal therapies and supplements) and documented in the medical record.    HPI  1. Hypertension  Blood pressure today on intake was 124/80  Current antihypertensive regimen is w/ Benicar 20 mg daily  Denies any cardiopulmonary symptoms such as chest pain or shortness of breath   CMP from 6 months prior was within normal limits    2. Hyperlipidemia  Last lipid panel was completed 6 months prior and was remarkable for:  --> Chol 165, LDL 47,  and Triglycerides 395  Currently on crestor 40 mg and tolerating well, no significant side effects  CT calcium scoring from 2023 was remarkable for a total score of 396.2      3. Abnormal TSH  Last lab draw had abnormal TSH with normal T4, he acknowledges some increase in fatigue and weight gain  Unclear etiology  Feels like it began when he turned 58    4. Loud Snoring  Wife reports he snores loudly, has to sleep in a different room or couch  Is affecting her sleep as well  Increase in fatigue and weight gain  Reports witnessed stop of breathing  Does not wake him however    FINDINGS:   The score and distribution of calcium in the coronary arteries is as   follows:      .7, mid to distal vessel   , proximal vessel and diagonal branch   LCx 61.5, proximal vessel and obtuse marginal branch   RCA 0,      Total   396.2     3. Subclinical hypothyroidism  TSH level from 6 months prior was 4.20 currently without therapy.  Denies any symptoms of unintentional weight gain, dry skin, and constipation     Review of Systems  All pertinent positive symptoms are included in the history of present illness.    All other systems have been reviewed and are negative and noncontributory to " "this patient's current ailments.    Past Medical History:   Diagnosis Date    Hyperlipidemia, unspecified 06/17/2022    Hyperlipemia     Past Surgical History:   Procedure Laterality Date    COLONOSCOPY  08/07/2017    Complete Colonoscopy     Social History     Tobacco Use    Smoking status: Some Days     Types: Cigarettes    Smokeless tobacco: Never     No family history on file.  Immunization History   Administered Date(s) Administered    Flu vaccine (IIV4), preservative free *Check age/dose* 11/14/2023    Influenza, Unspecified 10/20/2020    Moderna SARS-CoV-2 Vaccination 03/26/2021, 04/23/2021    Tdap vaccine, age 7 year and older (BOOSTRIX, ADACEL) 08/17/2021     Current Outpatient Medications   Medication Instructions    latanoprost (Xalatan) 0.005 % ophthalmic solution 1 drop, Both Eyes, Daily    olmesartan (BENICAR) 20 mg, oral, Daily    rosuvastatin (CRESTOR) 40 mg, oral, Daily     No Known Allergies    Objective   Vitals:    09/10/24 0947   BP: 124/80   Pulse: 70   Weight: 113 kg (250 lb)   Height: 1.905 m (6' 3\")     Body mass index is 31.25 kg/m².    BP Readings from Last 3 Encounters:   09/10/24 124/80   02/27/24 144/90   08/03/23 130/80      Wt Readings from Last 3 Encounters:   09/10/24 113 kg (250 lb)   02/27/24 118 kg (260 lb)   08/03/23 115 kg (253 lb)      Physical Exam  CONSTITUTIONAL - well nourished, well developed, looks like stated age, in no acute distress, not ill-appearing, and not tired appearing  SKIN - normal skin color and pigmentation, normal skin turgor without rash, lesions, or nodules visualized  HEAD - no trauma, normocephalic  EYES - extraocular muscles are intact, and normal external exam  NECK - supple without rigidity, no neck mass was observed, no thyromegaly or thyroid nodules  CHEST - clear to auscultation, no wheezing, no crackles and no rales, good effort  CARDIAC - regular rate and regular rhythm, no skipped beats, no murmur  ABDOMEN - no hernia, no mass, non tender, no " distention  EXTREMITIES - no obvious or evident edema, no obvious or evident deformities  NEUROLOGICAL - alert, oriented and no focal signs  PSYCHIATRIC - alert, pleasant and cordial, age-appropriate    Assessment/Plan   Problem List Items Addressed This Visit       Hyperlipemia - Primary     Triglycerides remain elevated but better controlled  Please keep working on dietary changes with a goal of weight loss  We will repeat lipid in 3 months after discussing lifestyle and dietary changes and should it continue to be elevated we discussed treating with Katerine  Return to clinic in 3 months to reevaluate          Relevant Medications    rosuvastatin (Crestor) 40 mg tablet    Other Relevant Orders    Lipid panel    Lipid Panel    Primary hypertension     BP at goal today  Repeat CMP today  Refill given         Relevant Medications    olmesartan (BENIcar) 20 mg tablet    Other Relevant Orders    Comprehensive Metabolic Panel    Loud snoring     Reviewed sleep study expectations and importance of evaluation  Likely related to his increase in weight gain and fatigue  STOP BANG Score 6 for high risk for JNIA  Referral to sleep study given today         Relevant Orders    In-Center Sleep Study (Non-Sleep Provider Only)    Abnormal TSH     We will continue to monitor with repeat TSH with reflex panel  Should T4 be abnormal we will treat         Relevant Orders    TSH with reflex to Free T4 if abnormal

## 2024-09-10 ENCOUNTER — APPOINTMENT (OUTPATIENT)
Dept: PRIMARY CARE | Facility: CLINIC | Age: 59
End: 2024-09-10
Payer: COMMERCIAL

## 2024-09-10 VITALS
WEIGHT: 250 LBS | BODY MASS INDEX: 31.08 KG/M2 | HEART RATE: 70 BPM | DIASTOLIC BLOOD PRESSURE: 80 MMHG | SYSTOLIC BLOOD PRESSURE: 124 MMHG | HEIGHT: 75 IN

## 2024-09-10 DIAGNOSIS — E03.8 SUBCLINICAL HYPOTHYROIDISM: ICD-10-CM

## 2024-09-10 DIAGNOSIS — I10 PRIMARY HYPERTENSION: ICD-10-CM

## 2024-09-10 DIAGNOSIS — R79.89 ABNORMAL TSH: ICD-10-CM

## 2024-09-10 DIAGNOSIS — R06.83 LOUD SNORING: ICD-10-CM

## 2024-09-10 DIAGNOSIS — E78.2 MIXED HYPERLIPIDEMIA: Primary | ICD-10-CM

## 2024-09-10 PROCEDURE — 3079F DIAST BP 80-89 MM HG: CPT | Performed by: FAMILY MEDICINE

## 2024-09-10 PROCEDURE — 99214 OFFICE O/P EST MOD 30 MIN: CPT | Performed by: FAMILY MEDICINE

## 2024-09-10 PROCEDURE — 4004F PT TOBACCO SCREEN RCVD TLK: CPT | Performed by: FAMILY MEDICINE

## 2024-09-10 PROCEDURE — 3074F SYST BP LT 130 MM HG: CPT | Performed by: FAMILY MEDICINE

## 2024-09-10 PROCEDURE — 3008F BODY MASS INDEX DOCD: CPT | Performed by: FAMILY MEDICINE

## 2024-09-10 RX ORDER — OLMESARTAN MEDOXOMIL 20 MG/1
20 TABLET ORAL DAILY
Qty: 90 TABLET | Refills: 1 | Status: SHIPPED | OUTPATIENT
Start: 2024-09-10 | End: 2025-03-09

## 2024-09-10 RX ORDER — EZETIMIBE 10 MG/1
10 TABLET ORAL DAILY
Qty: 30 TABLET | Refills: 5 | Status: CANCELLED | OUTPATIENT
Start: 2024-09-10 | End: 2025-03-09

## 2024-09-10 RX ORDER — ROSUVASTATIN CALCIUM 40 MG/1
40 TABLET, COATED ORAL DAILY
Qty: 90 TABLET | Refills: 1 | Status: SHIPPED | OUTPATIENT
Start: 2024-09-10 | End: 2025-03-09

## 2024-09-10 ASSESSMENT — PATIENT HEALTH QUESTIONNAIRE - PHQ9
1. LITTLE INTEREST OR PLEASURE IN DOING THINGS: NOT AT ALL
SUM OF ALL RESPONSES TO PHQ9 QUESTIONS 1 AND 2: 0
2. FEELING DOWN, DEPRESSED OR HOPELESS: NOT AT ALL

## 2024-09-10 NOTE — ASSESSMENT & PLAN NOTE
Reviewed sleep study expectations and importance of evaluation  Likely related to his increase in weight gain and fatigue  STOP BANG Score 6 for high risk for JINA  Referral to sleep study given today

## 2024-09-10 NOTE — ASSESSMENT & PLAN NOTE
We will continue to monitor with repeat TSH with reflex panel  Should T4 be abnormal we will treat

## 2024-09-10 NOTE — ASSESSMENT & PLAN NOTE
Triglycerides remain elevated but better controlled  Please keep working on dietary changes with a goal of weight loss  We will repeat lipid in 3 months after discussing lifestyle and dietary changes and should it continue to be elevated we discussed treating with Katerine  Return to clinic in 3 months to reevaluate

## 2024-09-19 ENCOUNTER — LAB (OUTPATIENT)
Dept: LAB | Facility: LAB | Age: 59
End: 2024-09-19
Payer: COMMERCIAL

## 2024-09-19 DIAGNOSIS — E78.2 MIXED HYPERLIPIDEMIA: ICD-10-CM

## 2024-09-19 DIAGNOSIS — I10 PRIMARY HYPERTENSION: ICD-10-CM

## 2024-09-19 DIAGNOSIS — R79.89 ABNORMAL TSH: ICD-10-CM

## 2024-09-19 LAB
ALBUMIN SERPL BCP-MCNC: 4.3 G/DL (ref 3.4–5)
ALP SERPL-CCNC: 58 U/L (ref 33–136)
ALT SERPL W P-5'-P-CCNC: 17 U/L (ref 10–52)
ANION GAP SERPL CALC-SCNC: 10 MMOL/L (ref 10–20)
AST SERPL W P-5'-P-CCNC: 16 U/L (ref 9–39)
BILIRUB SERPL-MCNC: 0.5 MG/DL (ref 0–1.2)
BUN SERPL-MCNC: 21 MG/DL (ref 6–23)
CALCIUM SERPL-MCNC: 8.9 MG/DL (ref 8.6–10.3)
CHLORIDE SERPL-SCNC: 101 MMOL/L (ref 98–107)
CHOLEST SERPL-MCNC: 155 MG/DL (ref 0–199)
CHOLESTEROL/HDL RATIO: 3.3
CO2 SERPL-SCNC: 27 MMOL/L (ref 21–32)
CREAT SERPL-MCNC: 0.76 MG/DL (ref 0.5–1.3)
EGFRCR SERPLBLD CKD-EPI 2021: >90 ML/MIN/1.73M*2
GLUCOSE SERPL-MCNC: 107 MG/DL (ref 74–99)
HDLC SERPL-MCNC: 46.9 MG/DL
LDLC SERPL CALC-MCNC: 69 MG/DL
NON HDL CHOLESTEROL: 108 MG/DL (ref 0–149)
POTASSIUM SERPL-SCNC: 4.2 MMOL/L (ref 3.5–5.3)
PROT SERPL-MCNC: 6.9 G/DL (ref 6.4–8.2)
SODIUM SERPL-SCNC: 134 MMOL/L (ref 136–145)
TRIGL SERPL-MCNC: 194 MG/DL (ref 0–149)
TSH SERPL-ACNC: 4.13 MIU/L (ref 0.44–3.98)
VLDL: 39 MG/DL (ref 0–40)

## 2024-09-19 PROCEDURE — 84443 ASSAY THYROID STIM HORMONE: CPT

## 2024-09-19 PROCEDURE — 80053 COMPREHEN METABOLIC PANEL: CPT

## 2024-09-19 PROCEDURE — 84439 ASSAY OF FREE THYROXINE: CPT

## 2024-09-19 PROCEDURE — 36415 COLL VENOUS BLD VENIPUNCTURE: CPT

## 2024-09-19 PROCEDURE — 80061 LIPID PANEL: CPT

## 2024-09-20 LAB — T4 FREE SERPL-MCNC: 0.89 NG/DL (ref 0.61–1.12)

## 2024-09-22 NOTE — RESULT ENCOUNTER NOTE
Your thyroid function continues to be slightly off, with a TSH of 4.13 (goal range: 0.35-2.00). However, since your T4 levels are normal, I do not think treatment is necessary at this time.  Based on this very borderline finding, I do not suspect that this has anything to do with fatigue or weight gain.    Your glucose level indicates a prediabetic state at 107. We will continue to monitor this closely.    Your sodium level is 2 points below normal. This is likely secondary to the olmesartan you are taking, but it is probably nothing to worry about at this time. We will keep an eye on it.    Kidney and liver function tests are normal.    Your cholesterol levels are as follows: total cholesterol is 155, LDL is 69, and triglycerides are 194. While triglycerides are still a bit elevated, they have significantly improved from your previous level of 395. Please continue taking rosuvastatin 40 mg daily.    All of your medications have been sent to the pharmacy and will be available until March 9, 2025.

## 2024-12-10 ENCOUNTER — APPOINTMENT (OUTPATIENT)
Dept: PRIMARY CARE | Facility: CLINIC | Age: 59
End: 2024-12-10
Payer: COMMERCIAL

## 2025-04-16 DIAGNOSIS — I10 PRIMARY HYPERTENSION: ICD-10-CM

## 2025-04-17 DIAGNOSIS — Z00.00 ANNUAL PHYSICAL EXAM: Primary | ICD-10-CM

## 2025-04-17 DIAGNOSIS — Z13.1 SCREENING FOR DIABETES MELLITUS: ICD-10-CM

## 2025-04-17 DIAGNOSIS — Z12.5 PROSTATE CANCER SCREENING: ICD-10-CM

## 2025-04-17 DIAGNOSIS — Z13.6 SCREENING FOR CARDIOVASCULAR CONDITION: ICD-10-CM

## 2025-04-17 DIAGNOSIS — Z13.0 SCREENING FOR BLOOD DISEASE: ICD-10-CM

## 2025-04-17 DIAGNOSIS — Z13.29 SCREENING FOR THYROID DISORDER: ICD-10-CM

## 2025-04-17 DIAGNOSIS — E78.2 MIXED HYPERLIPIDEMIA: ICD-10-CM

## 2025-04-17 DIAGNOSIS — I10 PRIMARY HYPERTENSION: ICD-10-CM

## 2025-04-17 RX ORDER — OLMESARTAN MEDOXOMIL 20 MG/1
20 TABLET ORAL DAILY
Qty: 90 TABLET | Refills: 1 | OUTPATIENT
Start: 2025-04-17

## 2025-04-17 RX ORDER — OLMESARTAN MEDOXOMIL 20 MG/1
20 TABLET ORAL DAILY
Qty: 30 TABLET | Refills: 0 | Status: SHIPPED | OUTPATIENT
Start: 2025-04-17 | End: 2025-05-17

## 2025-04-17 RX ORDER — ROSUVASTATIN CALCIUM 40 MG/1
40 TABLET, COATED ORAL DAILY
Qty: 30 TABLET | Refills: 0 | Status: SHIPPED | OUTPATIENT
Start: 2025-04-17 | End: 2025-05-17

## 2025-04-22 ENCOUNTER — APPOINTMENT (OUTPATIENT)
Dept: PRIMARY CARE | Facility: CLINIC | Age: 60
End: 2025-04-22
Payer: COMMERCIAL

## 2025-04-29 ENCOUNTER — APPOINTMENT (OUTPATIENT)
Dept: PRIMARY CARE | Facility: CLINIC | Age: 60
End: 2025-04-29
Payer: COMMERCIAL

## 2025-05-06 ENCOUNTER — APPOINTMENT (OUTPATIENT)
Dept: PRIMARY CARE | Facility: CLINIC | Age: 60
End: 2025-05-06
Payer: COMMERCIAL

## 2025-05-06 VITALS
WEIGHT: 255 LBS | HEART RATE: 67 BPM | BODY MASS INDEX: 31.71 KG/M2 | HEIGHT: 75 IN | DIASTOLIC BLOOD PRESSURE: 82 MMHG | SYSTOLIC BLOOD PRESSURE: 134 MMHG

## 2025-05-06 DIAGNOSIS — I10 PRIMARY HYPERTENSION: ICD-10-CM

## 2025-05-06 DIAGNOSIS — R06.83 LOUD SNORING: ICD-10-CM

## 2025-05-06 DIAGNOSIS — Z00.00 ANNUAL PHYSICAL EXAM: Primary | ICD-10-CM

## 2025-05-06 DIAGNOSIS — R79.89 ABNORMAL TSH: ICD-10-CM

## 2025-05-06 DIAGNOSIS — Z72.0 CURRENT NICOTINE USE: ICD-10-CM

## 2025-05-06 DIAGNOSIS — E78.2 MIXED HYPERLIPIDEMIA: ICD-10-CM

## 2025-05-06 PROCEDURE — 99214 OFFICE O/P EST MOD 30 MIN: CPT | Performed by: FAMILY MEDICINE

## 2025-05-06 PROCEDURE — 3008F BODY MASS INDEX DOCD: CPT | Performed by: FAMILY MEDICINE

## 2025-05-06 PROCEDURE — 4004F PT TOBACCO SCREEN RCVD TLK: CPT | Performed by: FAMILY MEDICINE

## 2025-05-06 PROCEDURE — 3079F DIAST BP 80-89 MM HG: CPT | Performed by: FAMILY MEDICINE

## 2025-05-06 PROCEDURE — 99396 PREV VISIT EST AGE 40-64: CPT | Performed by: FAMILY MEDICINE

## 2025-05-06 PROCEDURE — 3075F SYST BP GE 130 - 139MM HG: CPT | Performed by: FAMILY MEDICINE

## 2025-05-06 RX ORDER — ROSUVASTATIN CALCIUM 40 MG/1
40 TABLET, COATED ORAL DAILY
Qty: 30 TABLET | Refills: 0 | Status: SHIPPED | OUTPATIENT
Start: 2025-05-06 | End: 2025-06-05

## 2025-05-06 RX ORDER — OLMESARTAN MEDOXOMIL 20 MG/1
20 TABLET ORAL DAILY
Qty: 30 TABLET | Refills: 0 | Status: SHIPPED | OUTPATIENT
Start: 2025-05-06 | End: 2025-06-05

## 2025-05-06 ASSESSMENT — PATIENT HEALTH QUESTIONNAIRE - PHQ9
2. FEELING DOWN, DEPRESSED OR HOPELESS: NOT AT ALL
1. LITTLE INTEREST OR PLEASURE IN DOING THINGS: NOT AT ALL
SUM OF ALL RESPONSES TO PHQ9 QUESTIONS 1 AND 2: 0

## 2025-05-06 NOTE — PROGRESS NOTES
"Chief Complaint  Patient ID: Tanner Ingram \"Radha" is a 59 y.o. male who presents for Hypertension, Hyperlipidemia, Annual Exam, and Hypothyroidism.    Past Medical, Surgical, and Family History reviewed and updated in chart.    Reviewed all medications by prescribing practitioner or clinical pharmacist (such as prescriptions, OTCs, herbal therapies and supplements) and documented in the medical record.    History of Present Illness  1. Physical Exam     - Colonoscopy: last done 2024, repeat in 5 years.     - Immunizations: up to date.    2. Nicotine Use     - 1-2 cigarettes a day for 5 years.     - Does not qualify for lung cancer screening.     - Has been on and off for some time.     - Open to cutting back.    3. Hyperlipidemia (HLD)     - Reviewed lipid panel with LDL and total cholesterol within normal limits.     - HDL worsened.     - Triglycerides worsened from last draw.     - Does not take fish oil.    4. Hypertension (HTN)     - Does not measure blood pressure at home.     - Repeat blood pressure 136/82.     - Is currently recovering from a URI.    5. Subclinical Hypothyroidism     - TSH worsened since last draw.     - Denies side effects currently.     - T4 pending result.     - Would prefer not to take another medication.    6. Obstructive Sleep Apnea (JINA)     - Witnessed apnea with snoring, gasping for air in the middle of the night.     - In-center sleep study denied by insurance.     - Is open to a home sleep study.    Review of Systems  All pertinent positive symptoms are included in the history of present illness.    All other systems have been reviewed and are negative and noncontributory to this patient's current ailments.    Past Medical History  He has a past medical history of Hyperlipidemia, unspecified (06/17/2022).    Surgical History  He has a past surgical history that includes Colonoscopy (08/07/2017).     Social History  He reports that he has been smoking cigarettes. He has never used " "smokeless tobacco. No history on file for alcohol use and drug use.    Family History[1]  Medications Prior to Visit[2]    Allergies  Patient has no known allergies.    Immunization History   Administered Date(s) Administered    Flu vaccine (IIV4), preservative free *Check age/dose* 11/14/2023    Influenza, Unspecified 10/20/2020    Moderna SARS-CoV-2 Vaccination 03/26/2021, 04/23/2021    Tdap vaccine, age 7 year and older (BOOSTRIX, ADACEL) 08/17/2021     Objective   Visit Vitals  /82   Pulse 67   Ht 1.905 m (6' 3\")   Wt 116 kg (255 lb)   BMI 31.87 kg/m²   Smoking Status Some Days   BSA 2.48 m²        BP Readings from Last 3 Encounters:   05/06/25 134/82   09/10/24 124/80   02/27/24 144/90      Wt Readings from Last 3 Encounters:   05/06/25 116 kg (255 lb)   09/10/24 113 kg (250 lb)   02/27/24 118 kg (260 lb)      Relevant Results  Refill on 04/17/2025   Component Date Value    PSA, TOTAL 05/05/2025 0.96     CHOLESTEROL, TOTAL 05/05/2025 138     HDL CHOLESTEROL 05/05/2025 36 (L)     TRIGLYCERIDES 05/05/2025 258 (H)     LDL-CHOLESTEROL 05/05/2025 68     CHOL/HDLC RATIO 05/05/2025 3.8     NON HDL CHOLESTEROL 05/05/2025 102     TSH W/REFLEX TO FT4 05/05/2025 5.61 (H)     GLUCOSE 05/05/2025 105 (H)     UREA NITROGEN (BUN) 05/05/2025 17     CREATININE 05/05/2025 0.61 (L)     EGFR 05/05/2025 109     SODIUM 05/05/2025 138     POTASSIUM 05/05/2025 4.3     CHLORIDE 05/05/2025 104     CARBON DIOXIDE 05/05/2025 24     ELECTROLYTE BALANCE 05/05/2025 10     CALCIUM 05/05/2025 8.7     PROTEIN, TOTAL 05/05/2025 6.7     ALBUMIN 05/05/2025 4.2     BILIRUBIN, TOTAL 05/05/2025 0.3     ALKALINE PHOSPHATASE 05/05/2025 65     AST 05/05/2025 16     ALT 05/05/2025 17     WHITE BLOOD CELL COUNT 05/05/2025 3.9     RED BLOOD CELL COUNT 05/05/2025 5.08     HEMOGLOBIN 05/05/2025 14.7     HEMATOCRIT 05/05/2025 45.9     MCV 05/05/2025 90.4     MCH 05/05/2025 28.9     MCHC 05/05/2025 32.0     RDW 05/05/2025 13.6     PLATELET COUNT " 05/05/2025 193     MPV 05/05/2025 10.0     ABSOLUTE NEUTROPHILS 05/05/2025 2,348     ABSOLUTE LYMPHOCYTES 05/05/2025 998     ABSOLUTE MONOCYTES 05/05/2025 425     ABSOLUTE EOSINOPHILS 05/05/2025 109     ABSOLUTE BASOPHILS 05/05/2025 20     NEUTROPHILS 05/05/2025 60.2     LYMPHOCYTES 05/05/2025 25.6     MONOCYTES 05/05/2025 10.9     EOSINOPHILS 05/05/2025 2.8     BASOPHILS 05/05/2025 0.5      The 10-year ASCVD risk score (Rick ALBERTO, et al., 2019) is: 16.4%    Values used to calculate the score:      Age: 59 years      Sex: Male      Is Non- : No      Diabetic: No      Tobacco smoker: Yes      Systolic Blood Pressure: 134 mmHg      Is BP treated: Yes      HDL Cholesterol: 36 mg/dL      Total Cholesterol: 138 mg/dL    Physical Exam  CONSTITUTIONAL - well nourished, well developed, looks like stated age, in no acute distress, not ill-appearing, and not tired appearing  SKIN - normal skin color and pigmentation, normal skin turgor without rash, lesions, or nodules visualized  HEAD - no trauma, normocephalic  EYES - pupils are equal and reactive to light, extraocular muscles are intact, and normal external exam  CHEST - clear to auscultation, no wheezing, no crackles and no rales, good effort  CARDIAC - regular rate and regular rhythm, no skipped beats, no murmur  EXTREMITIES - no obvious or evident edema, no obvious or evident deformities  NEUROLOGICAL - normal gait, normal balance, normal motor, no ataxia, alert, oriented and no focal signs  PSYCHIATRIC - alert, pleasant and cordial, age-appropriate    Assessment and Plan  Problem List Items Addressed This Visit       Hyperlipemia    Triglycerides remain elevated  Please keep working on dietary changes with a goal of weight loss  Encouraged use of fish oil supplements   Continue lifestyle changes  Already on high dose statin medication  Refill sent         Relevant Medications    olmesartan (BENIcar) 20 mg tablet    rosuvastatin (Crestor) 40 mg  tablet    Primary hypertension    BP at goal today  Reviewed CMP with mildly decrease creatinine level         Relevant Medications    olmesartan (BENIcar) 20 mg tablet    rosuvastatin (Crestor) 40 mg tablet    Annual physical exam - Primary    Colonoscopy completed  Physical exam without abnormalities         Loud snoring    Reviewed sleep study expectations and importance of evaluation  Likely related to his increase in weight gain and fatigue  STOP BANG Score 6 for high risk for JINA  Home sleep study ordered as in center sleep study rejected by insurance         Abnormal TSH    We will continue to monitor with repeat TSH with reflex panel  Should T4 be abnormal we will treat  It is currently pending         Current nicotine use    Reviewed cutting back on cigarette use  Does not qualify for low dose CT               [1] No family history on file.  [2]   Outpatient Medications Prior to Visit   Medication Sig Dispense Refill    olmesartan (BENIcar) 20 mg tablet Take 1 tablet (20 mg) by mouth once daily. 30 tablet 0    rosuvastatin (Crestor) 40 mg tablet Take 1 tablet (40 mg) by mouth once daily. 30 tablet 0    latanoprost (Xalatan) 0.005 % ophthalmic solution Administer 1 drop into both eyes once daily.       No facility-administered medications prior to visit.

## 2025-05-06 NOTE — ASSESSMENT & PLAN NOTE
Triglycerides remain elevated  Please keep working on dietary changes with a goal of weight loss  Encouraged use of fish oil supplements   Continue lifestyle changes  Already on high dose statin medication  Refill sent

## 2025-05-06 NOTE — ASSESSMENT & PLAN NOTE
We will continue to monitor with repeat TSH with reflex panel  Should T4 be abnormal we will treat  It is currently pending

## 2025-05-06 NOTE — ASSESSMENT & PLAN NOTE
Reviewed sleep study expectations and importance of evaluation  Likely related to his increase in weight gain and fatigue  STOP BANG Score 6 for high risk for JINA  Home sleep study ordered as in center sleep study rejected by insurance

## 2025-05-07 LAB
ALBUMIN SERPL-MCNC: 4.2 G/DL (ref 3.6–5.1)
ALP SERPL-CCNC: 65 U/L (ref 35–144)
ALT SERPL-CCNC: 17 U/L (ref 9–46)
ANION GAP SERPL CALCULATED.4IONS-SCNC: 10 MMOL/L (CALC) (ref 7–17)
AST SERPL-CCNC: 16 U/L (ref 10–35)
BASOPHILS # BLD AUTO: 20 CELLS/UL (ref 0–200)
BASOPHILS NFR BLD AUTO: 0.5 %
BILIRUB SERPL-MCNC: 0.3 MG/DL (ref 0.2–1.2)
BUN SERPL-MCNC: 17 MG/DL (ref 7–25)
CALCIUM SERPL-MCNC: 8.7 MG/DL (ref 8.6–10.3)
CHLORIDE SERPL-SCNC: 104 MMOL/L (ref 98–110)
CHOLEST SERPL-MCNC: 138 MG/DL
CHOLEST/HDLC SERPL: 3.8 (CALC)
CO2 SERPL-SCNC: 24 MMOL/L (ref 20–32)
CREAT SERPL-MCNC: 0.61 MG/DL (ref 0.7–1.35)
EGFRCR SERPLBLD CKD-EPI 2021: 109 ML/MIN/1.73M2
EOSINOPHIL # BLD AUTO: 109 CELLS/UL (ref 15–500)
EOSINOPHIL NFR BLD AUTO: 2.8 %
ERYTHROCYTE [DISTWIDTH] IN BLOOD BY AUTOMATED COUNT: 13.6 % (ref 11–15)
EST. AVERAGE GLUCOSE BLD GHB EST-MCNC: 128 MG/DL
EST. AVERAGE GLUCOSE BLD GHB EST-SCNC: 7.1 MMOL/L
GLUCOSE SERPL-MCNC: 105 MG/DL (ref 65–99)
HBA1C MFR BLD: 6.1 %
HCT VFR BLD AUTO: 45.9 % (ref 38.5–50)
HDLC SERPL-MCNC: 36 MG/DL
HGB BLD-MCNC: 14.7 G/DL (ref 13.2–17.1)
LDLC SERPL CALC-MCNC: 68 MG/DL (CALC)
LYMPHOCYTES # BLD AUTO: 998 CELLS/UL (ref 850–3900)
LYMPHOCYTES NFR BLD AUTO: 25.6 %
MCH RBC QN AUTO: 28.9 PG (ref 27–33)
MCHC RBC AUTO-ENTMCNC: 32 G/DL (ref 32–36)
MCV RBC AUTO: 90.4 FL (ref 80–100)
MONOCYTES # BLD AUTO: 425 CELLS/UL (ref 200–950)
MONOCYTES NFR BLD AUTO: 10.9 %
NEUTROPHILS # BLD AUTO: 2348 CELLS/UL (ref 1500–7800)
NEUTROPHILS NFR BLD AUTO: 60.2 %
NONHDLC SERPL-MCNC: 102 MG/DL (CALC)
PLATELET # BLD AUTO: 193 THOUSAND/UL (ref 140–400)
PMV BLD REES-ECKER: 10 FL (ref 7.5–12.5)
POTASSIUM SERPL-SCNC: 4.3 MMOL/L (ref 3.5–5.3)
PROT SERPL-MCNC: 6.7 G/DL (ref 6.1–8.1)
PSA SERPL-MCNC: 0.96 NG/ML
RBC # BLD AUTO: 5.08 MILLION/UL (ref 4.2–5.8)
SODIUM SERPL-SCNC: 138 MMOL/L (ref 135–146)
T4 FREE SERPL-MCNC: 1.2 NG/DL (ref 0.8–1.8)
TRIGL SERPL-MCNC: 258 MG/DL
TSH SERPL-ACNC: 5.61 MIU/L (ref 0.4–4.5)
WBC # BLD AUTO: 3.9 THOUSAND/UL (ref 3.8–10.8)

## 2025-05-28 ENCOUNTER — APPOINTMENT (OUTPATIENT)
Dept: SLEEP MEDICINE | Facility: CLINIC | Age: 60
End: 2025-05-28
Payer: COMMERCIAL

## 2025-08-14 DIAGNOSIS — E78.2 MIXED HYPERLIPIDEMIA: ICD-10-CM

## 2025-08-14 DIAGNOSIS — I10 PRIMARY HYPERTENSION: ICD-10-CM

## 2025-08-14 RX ORDER — OLMESARTAN MEDOXOMIL 20 MG/1
20 TABLET ORAL DAILY
Qty: 90 TABLET | Refills: 0 | Status: SHIPPED | OUTPATIENT
Start: 2025-08-14 | End: 2025-11-12

## 2025-08-14 RX ORDER — ROSUVASTATIN CALCIUM 40 MG/1
40 TABLET, COATED ORAL DAILY
Qty: 90 TABLET | Refills: 0 | Status: SHIPPED | OUTPATIENT
Start: 2025-08-14 | End: 2025-11-12

## 2025-09-15 ENCOUNTER — APPOINTMENT (OUTPATIENT)
Dept: DERMATOLOGY | Facility: CLINIC | Age: 60
End: 2025-09-15
Payer: COMMERCIAL